# Patient Record
Sex: FEMALE | Race: OTHER | HISPANIC OR LATINO
[De-identification: names, ages, dates, MRNs, and addresses within clinical notes are randomized per-mention and may not be internally consistent; named-entity substitution may affect disease eponyms.]

---

## 2020-05-04 PROBLEM — Z00.00 ENCOUNTER FOR PREVENTIVE HEALTH EXAMINATION: Status: ACTIVE | Noted: 2020-05-04

## 2020-05-06 ENCOUNTER — APPOINTMENT (OUTPATIENT)
Dept: GYNECOLOGIC ONCOLOGY | Facility: CLINIC | Age: 35
End: 2020-05-06
Payer: COMMERCIAL

## 2020-05-06 DIAGNOSIS — R10.2 PELVIC AND PERINEAL PAIN: ICD-10-CM

## 2020-05-06 DIAGNOSIS — Z82.49 FAMILY HISTORY OF ISCHEMIC HEART DISEASE AND OTHER DISEASES OF THE CIRCULATORY SYSTEM: ICD-10-CM

## 2020-05-06 PROCEDURE — 76857 US EXAM PELVIC LIMITED: CPT | Mod: 59

## 2020-05-06 PROCEDURE — 99244 OFF/OP CNSLTJ NEW/EST MOD 40: CPT | Mod: 25

## 2020-05-06 PROCEDURE — 93976 VASCULAR STUDY: CPT | Mod: 59

## 2020-05-06 PROCEDURE — 76830 TRANSVAGINAL US NON-OB: CPT | Mod: 59

## 2020-05-06 RX ORDER — MULTIVITAMIN
TABLET ORAL
Refills: 0 | Status: ACTIVE | COMMUNITY

## 2020-05-10 NOTE — HISTORY OF PRESENT ILLNESS
[FreeTextEntry1] : This 36yo ,  x 2, Mirena IUD-no menses, referred by Dr. Dunn for evaluation of ovarian cyst and pelvic pain. Pt presented to [facility] with diffuse abdominal pain with radiation to back on 20. Pelvic US revealed a 4cm complex cystic lesion in right ovary with only peripheral blood flow, torsion considered. Ctscan revealed significant stool burden, IUD and a 3.8cm right adnexal cystic lesion. Follow up pelvic US on 20 revealed IUD in place, 3.5mm lining, right ovarian complex cyst measuring 47 x 32 x 43mm with small amount of FF. The pain subsided a few days later and now just feels soreness. Denies dyspareunia. Denies h/o cysts or endometriosis.  \par \par Pap smear-2019-[id number]

## 2020-05-10 NOTE — END OF VISIT
[FreeTextEntry3] : Written by Kelsey STOUT, acting as a scribe for Dr. Eric Lyon.\par This note accurately reflects the work and decisions made by me.\par

## 2020-05-10 NOTE — PHYSICAL EXAM
[Normal] : Bimanual Exam: Normal [de-identified] : Patient was interviewed and examined with chaperone present. Name of chaperone: Kelsey Mckay

## 2020-05-10 NOTE — CHIEF COMPLAINT
[FreeTextEntry1] : [facility]\par \par [facility] [phone] at [address] [place] [phone]\par

## 2020-05-10 NOTE — ASSESSMENT
[FreeTextEntry1] : 34yo with pelvic pain and complex ovarian cyst. Pelvic US in my office today revealed IUD, 2.7cm right ovarian complex cyst. I explained that the pain she had experienced last week may have been from intermittent torsion. I would like to obtain an MRI pelvis for further clarification and a GIA. Discussed my low suspicion of a malignancy.

## 2020-05-17 ENCOUNTER — APPOINTMENT (OUTPATIENT)
Dept: MRI IMAGING | Facility: CLINIC | Age: 35
End: 2020-05-17
Payer: COMMERCIAL

## 2020-05-17 ENCOUNTER — OUTPATIENT (OUTPATIENT)
Dept: OUTPATIENT SERVICES | Facility: HOSPITAL | Age: 35
LOS: 1 days | End: 2020-05-17
Payer: COMMERCIAL

## 2020-05-17 DIAGNOSIS — N83.209 UNSPECIFIED OVARIAN CYST, UNSPECIFIED SIDE: ICD-10-CM

## 2020-05-17 DIAGNOSIS — Z00.00 ENCOUNTER FOR GENERAL ADULT MEDICAL EXAMINATION WITHOUT ABNORMAL FINDINGS: ICD-10-CM

## 2020-05-17 PROCEDURE — 72197 MRI PELVIS W/O & W/DYE: CPT | Mod: 26

## 2020-05-17 PROCEDURE — A9585: CPT

## 2020-05-17 PROCEDURE — 72197 MRI PELVIS W/O & W/DYE: CPT

## 2020-05-18 ENCOUNTER — TRANSCRIPTION ENCOUNTER (OUTPATIENT)
Age: 35
End: 2020-05-18

## 2020-05-18 ENCOUNTER — APPOINTMENT (OUTPATIENT)
Dept: GYNECOLOGIC ONCOLOGY | Facility: CLINIC | Age: 35
End: 2020-05-18
Payer: COMMERCIAL

## 2020-05-18 DIAGNOSIS — N83.209 UNSPECIFIED OVARIAN CYST, UNSPECIFIED SIDE: ICD-10-CM

## 2020-05-18 PROCEDURE — 99212 OFFICE O/P EST SF 10 MIN: CPT | Mod: 95

## 2020-05-18 NOTE — ASSESSMENT
[FreeTextEntry1] : Discussed with patient that on her MRI her cyst appears smaller. She also has some adenomyosis, but despite her findings she is feeling well. I am not recommending surgical intervention at this time. I am recommending a follow up US in two months.

## 2020-05-18 NOTE — END OF VISIT
[Time Spent: ___ minutes] : I have spent [unfilled] minutes of time on the encounter. [>50% of the face to face encounter time was spent on counseling and/or coordination of care for ___] : Greater than 50% of the face to face encounter time was spent on counseling and/or coordination of care for [unfilled] [FreeTextEntry3] : Written by Nasra Allen, acting as a scribe for Dr. Eric Lyon..\par This note accurately reflects the work and decisions made by me\par

## 2020-05-18 NOTE — HISTORY OF PRESENT ILLNESS
[Home] : at home, [unfilled] , at the time of the visit. [Other Location: e.g. Home (Enter Location, City,State)___] : at [unfilled] [Self] : self [Patient] : the patient [FreeTextEntry1] : 34 y/o with Mirena IUD for contraceptive use was referred by Dr. Dunn for evaluation of a 4 cm complex cystic lesion in the right ovary, with only peripheral blood flow, torsion considered and pelvic pain. She was seen in my office on 5/6/20 for a consultation. On US from my office the cyst measured 2.7 cm right ovarian complex cyst. I explained that her pain may have been due to intermittent torsion. I ordered a MRI pelvis and GIA to evaluate. \par \par MRI pelvis 5/17/20- see PACS\par GIA 5/9/20- WNL

## 2020-05-18 NOTE — REASON FOR VISIT
[FreeTextEntry1] : [facility]\par \par [facility] [phone] at [address] [place] [phone]\par

## 2020-07-10 ENCOUNTER — APPOINTMENT (OUTPATIENT)
Dept: GYNECOLOGIC ONCOLOGY | Facility: CLINIC | Age: 35
End: 2020-07-10